# Patient Record
Sex: MALE | URBAN - METROPOLITAN AREA
[De-identification: names, ages, dates, MRNs, and addresses within clinical notes are randomized per-mention and may not be internally consistent; named-entity substitution may affect disease eponyms.]

---

## 2018-07-09 ENCOUNTER — OFFICE VISIT (OUTPATIENT)
Dept: OPHTHALMOLOGY | Facility: CLINIC | Age: 53
End: 2018-07-09
Attending: OPHTHALMOLOGY
Payer: COMMERCIAL

## 2018-07-09 DIAGNOSIS — H52.13 MYOPIC ASTIGMATISM OF BOTH EYES: Primary | ICD-10-CM

## 2018-07-09 DIAGNOSIS — H52.4 PRESBYOPIA: ICD-10-CM

## 2018-07-09 DIAGNOSIS — H52.203 MYOPIC ASTIGMATISM OF BOTH EYES: Primary | ICD-10-CM

## 2018-07-09 PROCEDURE — G0463 HOSPITAL OUTPT CLINIC VISIT: HCPCS | Mod: ZF

## 2018-07-09 PROCEDURE — 92015 DETERMINE REFRACTIVE STATE: CPT | Mod: ZF

## 2018-07-09 RX ORDER — ASPIRIN 81 MG/1
81 TABLET ORAL
COMMUNITY
Start: 2011-02-18

## 2018-07-09 RX ORDER — ASCORBIC ACID 500 MG
500 TABLET ORAL
COMMUNITY
Start: 2011-02-18

## 2018-07-09 ASSESSMENT — EXTERNAL EXAM - LEFT EYE: OS_EXAM: NORMAL

## 2018-07-09 ASSESSMENT — REFRACTION_MANIFEST
OD_ADD: +2.50
OS_ADD: +2.50
OS_SPHERE: -4.00
OD_AXIS: 015
OD_SPHERE: -3.75
OD_CYLINDER: +0.25
OS_CYLINDER: +0.50
OS_AXIS: 165

## 2018-07-09 ASSESSMENT — TONOMETRY
IOP_METHOD: TONOPEN
OS_IOP_MMHG: 17
OD_IOP_MMHG: 15

## 2018-07-09 ASSESSMENT — CUP TO DISC RATIO
OD_RATIO: 0.3
OS_RATIO: 0.3

## 2018-07-09 ASSESSMENT — VISUAL ACUITY
OS_CC: 20/20
OS_CC+: -1
OD_CC+: -1
METHOD: SNELLEN - LINEAR
OD_CC: 20/20
CORRECTION_TYPE: GLASSES

## 2018-07-09 ASSESSMENT — REFRACTION_WEARINGRX
OS_AXIS: 170
OS_SPHERE: -3.75
OD_ADD: +2.25
OS_CYLINDER: +0.50
OD_AXIS: 093
OS_ADD: +2.25
OD_CYLINDER: +0.25
OD_SPHERE: -3.75

## 2018-07-09 ASSESSMENT — EXTERNAL EXAM - RIGHT EYE: OD_EXAM: NORMAL

## 2018-07-09 ASSESSMENT — CONF VISUAL FIELD
OS_NORMAL: 1
OD_NORMAL: 1

## 2018-07-09 ASSESSMENT — SLIT LAMP EXAM - LIDS
COMMENTS: NORMAL
COMMENTS: NORMAL

## 2018-07-09 NOTE — MR AVS SNAPSHOT
After Visit Summary   2018    Yovanny Vincent    MRN: [id number]           Patient Information     Date Of Birth          1965        Visit Information        Provider Department      2018 1:45 PM [facility]        Today's Diagnoses     Myopic astigmatism of both eyes    -  1    Presbyopia           Follow-ups after your visit        Follow-up notes from your care team     Return in about 1 year (around 2019).      Who to contact     Please call your clinic at [phone] to:    Ask questions about your health    Make or cancel appointments    Discuss your medicines    Learn about your test results    Speak to your doctor            Additional Information About Your Visit        MyChart Information     [employer] is an electronic gateway that provides easy, online access to your medical records. With [employer], you can request a clinic appointment, read your test results, renew a prescription or communicate with your care team.     To sign up for [employer] visit the website at [email or web address]   You will be asked to enter the access code listed below, as well as some personal information. Please follow the directions to create your username and password.     Your access code is: [email or web address]  Expires: 2018  5:27 PM     Your access code will  in 90 days. If you need help or a new code, please contact your [facility] or call [phone] for assistance.        Care EveryWhere ID     This is your Care EveryWhere ID. This could be used by other organizations to access your [facility] medical records  [id number]         Blood Pressure from Last 3 Encounters:   No data found for BP    Weight from Last 3 Encounters:   No data found for Wt              Today, you had the following     No orders found for display       Primary Care Provider Fax #    Physician No Ref-Primary [phone]       No address on file        Equal  Access to Services     [facility]: Hadii aad ku hadbriankayla Mariiedd, waikeda luqadaha, qaybta kavenkatajerzy coppola. So [facility] [phone].    ATENCIÓN: Si habla español, tiene a crawley disposición servicios gratuitos de asistencia lingüística. Llame al [phone].    We comply with applicable federal civil rights laws and [place] laws. We do not discriminate on the basis of race, color, national origin, age, disability, sex, sexual orientation, or gender identity.            Thank you!     Thank you for choosing [facility]  for your care. Our goal is always to provide you with excellent care. Hearing back from our patients is one way we can continue to improve our services. Please take a few minutes to complete the written survey that you may receive in the mail after your visit with us. Thank you!             Your Updated Medication List - Protect others around you: Learn how to safely use, store and throw away your medicines at www.disposemymeds.org.          This list is accurate as of 7/9/18  3:42 PM.  Always use your most recent med list.                   Brand Name Dispense Instructions for use Diagnosis    ascorbic acid 500 MG tablet    VITAMIN C     Take 500 mg by mouth        aspirin 81 MG EC tablet      Take 81 mg by mouth

## 2018-07-09 NOTE — PROGRESS NOTES
"JESSICA Hairston P \"Pat\" Carlton is a 52 year old male here for full eye exam. He feels his vision is overall good and stable in both eyes with current glasses. No eye pain, redness, discharge. No flashes/floaters.    POH: No history of eye surgery or trauma  PMH: No diabetes  FH: No known FH of AMD or glaucoma  SH: Works at [employer]    Assessment & Plan      (H52.203,  H52.13) Myopic astigmatism of both eyes  (primary encounter diagnosis)/(H52.4) Presbyopia  Comment: Good vision with minimal change in refraction  Plan: Given updated glasses Rx.      -----------------------------------------------------------------------------------    Patient disposition:   Return in about 1 year (around 7/9/2019). or sooner as needed.    Teaching statement:  Complete documentation of historical and exam elements from today's encounter can be found in the full encounter summary report (not reduplicated in this progress note). I personally obtained the chief complaint(s) and history of present illness.  I confirmed and edited as necessary the review of systems, past medical/surgical history, family history, social history, and examination findings as documented by others; and I examined the patient myself. I personally reviewed the relevant tests, images, and reports as documented above.     I formulated and edited as necessary the assessment and plan and discussed the findings and management plan with the patient and family.    Monica Loving MD  Comprehensive Ophthalmology & Ocular Pathology  Department of Ophthalmology and Visual Neurosciences  [email or web address]  Pager [phone]    "

## 2018-07-09 NOTE — NURSING NOTE
Chief Complaints and History of Present Illnesses   Patient presents with     Yearly Exam     routine exam     HPI    Affected eye(s):  Both   Symptoms:     No floaters   No flashes   No glare   No halos         Do you have eye pain now?:  No      Comments:  Routine exam  No problems   Arely Jackson COA 2:39 PM July 9, 2018